# Patient Record
Sex: FEMALE | Race: WHITE | Employment: FULL TIME | URBAN - METROPOLITAN AREA
[De-identification: names, ages, dates, MRNs, and addresses within clinical notes are randomized per-mention and may not be internally consistent; named-entity substitution may affect disease eponyms.]

---

## 2024-03-04 ENCOUNTER — APPOINTMENT (OUTPATIENT)
Dept: RADIOLOGY | Facility: AMBULARY SURGERY CENTER | Age: 39
End: 2024-03-04
Payer: COMMERCIAL

## 2024-03-04 ENCOUNTER — OFFICE VISIT (OUTPATIENT)
Dept: OBGYN CLINIC | Facility: CLINIC | Age: 39
End: 2024-03-04

## 2024-03-04 VITALS
HEART RATE: 99 BPM | SYSTOLIC BLOOD PRESSURE: 142 MMHG | BODY MASS INDEX: 28.68 KG/M2 | WEIGHT: 168 LBS | HEIGHT: 64 IN | DIASTOLIC BLOOD PRESSURE: 84 MMHG

## 2024-03-04 DIAGNOSIS — G89.29 CHRONIC LEFT SHOULDER PAIN: Primary | ICD-10-CM

## 2024-03-04 DIAGNOSIS — M25.512 CHRONIC LEFT SHOULDER PAIN: ICD-10-CM

## 2024-03-04 DIAGNOSIS — M25.512 CHRONIC LEFT SHOULDER PAIN: Primary | ICD-10-CM

## 2024-03-04 DIAGNOSIS — G89.29 CHRONIC LEFT SHOULDER PAIN: ICD-10-CM

## 2024-03-04 PROCEDURE — 73030 X-RAY EXAM OF SHOULDER: CPT

## 2024-03-04 PROCEDURE — 99204 OFFICE O/P NEW MOD 45 MIN: CPT | Performed by: PHYSICAL MEDICINE & REHABILITATION

## 2024-03-04 PROCEDURE — 20610 DRAIN/INJ JOINT/BURSA W/O US: CPT | Performed by: PHYSICAL MEDICINE & REHABILITATION

## 2024-03-04 RX ORDER — ROPIVACAINE HYDROCHLORIDE 5 MG/ML
10 INJECTION, SOLUTION EPIDURAL; INFILTRATION; PERINEURAL
Status: COMPLETED | OUTPATIENT
Start: 2024-03-04 | End: 2024-03-04

## 2024-03-04 RX ORDER — TRIAMCINOLONE ACETONIDE 40 MG/ML
80 INJECTION, SUSPENSION INTRA-ARTICULAR; INTRAMUSCULAR
Status: COMPLETED | OUTPATIENT
Start: 2024-03-04 | End: 2024-03-04

## 2024-03-04 RX ADMIN — ROPIVACAINE HYDROCHLORIDE 10 ML: 5 INJECTION, SOLUTION EPIDURAL; INFILTRATION; PERINEURAL at 12:00

## 2024-03-04 RX ADMIN — TRIAMCINOLONE ACETONIDE 80 MG: 40 INJECTION, SUSPENSION INTRA-ARTICULAR; INTRAMUSCULAR at 12:00

## 2024-03-04 NOTE — PROGRESS NOTES
1. Chronic left shoulder pain      Orders Placed This Encounter   Procedures    Large joint arthrocentesis: L subacromial bursa    XR shoulder 2+ vw left     New Medications Ordered This Visit   Medications    esomeprazole (NexIUM) 20 mg capsule     Sig: Take 20 mg by mouth every morning before breakfast     Impression:  Left shoulder pain likely secondary to subacromial impingement/calcific tendinitis.  We discussed different treatment options and decided to proceed with a subacromial space steroid injection.  She will continue with her home exercise program.  I will see her back in 4 weeks if needed.    Imaging Studies (I personally reviewed images in PACS and report):  Left shoulder x-rays most recent to this encounter reviewed.  These images show a small rounded radiodensity at the greater tuberosity which likely represents calcific tendinitis.  No acute osseous abnormalities or severe degeneration.    No follow-ups on file.    Patient is in agreement with the above plan.    HPI:  Lynn Marin is a 39 y.o. female  who presents for evaluation of   Chief Complaint   Patient presents with    Left Shoulder - Pain     Onset/Mechanism: Chronic pain for past 6 years without an injury.  Location: Down the arm and into the neck.  Radiation: As above.  Provocative: Reaching activities.  Severity: Painful.  Associated Symptoms: Denies.  Treatment so far: 2019 she had an injection which helped.    Following history reviewed and updated:  History reviewed. No pertinent past medical history.  History reviewed. No pertinent surgical history.  Social History   Social History     Substance and Sexual Activity   Alcohol Use None     Social History     Substance and Sexual Activity   Drug Use Not on file     Social History     Tobacco Use   Smoking Status Not on file   Smokeless Tobacco Not on file     History reviewed. No pertinent family history.  Allergies   Allergen Reactions    Latex Swelling    Other Anaphylaxis      "Chickpeas    Raspberry - Food Allergy Hives    Sulfa Antibiotics Dizziness    Doxycycline Rash        Constitutional:  /84   Pulse 99   Ht 5' 4\" (1.626 m)   Wt 76.2 kg (168 lb)   BMI 28.84 kg/m²    General: NAD.  Eyes: Anicteric sclerae.  Neck: Supple.  Lungs: Unlabored breathing.  Cardiovascular: No lower extremity edema.  Skin: Intact without erythema.  Neurologic: Sensation intact to light touch.  Psychiatric: Mood and affect are appropriate.    Left Shoulder Exam     Tenderness   The patient is experiencing tenderness in the acromion.    Range of Motion   Active abduction:  120 abnormal   Forward flexion:  normal     Tests   Parr test: positive  Impingement: positive    Other   Erythema: absent  Scars: absent  Sensation: normal  Pulse: present              Large joint arthrocentesis: L subacromial bursa  Universal Protocol:  Consent: Verbal consent obtained. Written consent not obtained.  Risks and benefits: risks, benefits and alternatives were discussed  Consent given by: patient  Timeout called at: 3/4/2024 12:04 PM.  Patient understanding: patient states understanding of the procedure being performed  Site marked: the operative site was marked  Patient identity confirmed: verbally with patient  Supporting Documentation  Indications: pain   Procedure Details  Location: shoulder - L subacromial bursa  Needle gauge: 21G 2''  Ultrasound guidance: no  Approach: posterolateral  Medications administered: 80 mg triamcinolone acetonide 40 mg/mL; 10 mL ropivacaine 0.5 %    Patient tolerance: patient tolerated the procedure well with no immediate complications  Dressing:  Sterile dressing applied    There was little to no resistance encountered during the injection.    Risks of this procedure include:    - Risk of bleeding since a needle is involved.  - Risk of infection (1/10,000 chance as per recent studies).  Signs/symptoms were discussed and they would prompt an urgent evaluation at an emergency " department.  - Risk of pigmentation or skin dimpling in the skin (2-3% chance as per recent studies) from the steroid.  - Risk of increased pain from steroid flare (1% chance as per recent studies) that typically lasts 24-48 hours.  - Risk of increased blood sugars from the steroid medication that can last for a few weeks.  If the patient is a diabetic or pre-diabetic, they were encouraged to closely monitor their blood sugars and discuss with PCP if elevated more than usual or if having symptoms.    The benefits outweigh the risks and so the procedure was completed.

## 2024-04-29 ENCOUNTER — OFFICE VISIT (OUTPATIENT)
Dept: OBGYN CLINIC | Facility: CLINIC | Age: 39
End: 2024-04-29
Payer: COMMERCIAL

## 2024-04-29 VITALS
WEIGHT: 168 LBS | HEART RATE: 88 BPM | DIASTOLIC BLOOD PRESSURE: 82 MMHG | SYSTOLIC BLOOD PRESSURE: 121 MMHG | HEIGHT: 64 IN | BODY MASS INDEX: 28.68 KG/M2

## 2024-04-29 DIAGNOSIS — M79.18 MYOFASCIAL PAIN SYNDROME, CERVICAL: ICD-10-CM

## 2024-04-29 DIAGNOSIS — G89.29 CHRONIC LEFT SHOULDER PAIN: Primary | ICD-10-CM

## 2024-04-29 DIAGNOSIS — M25.512 CHRONIC LEFT SHOULDER PAIN: Primary | ICD-10-CM

## 2024-04-29 PROCEDURE — 99214 OFFICE O/P EST MOD 30 MIN: CPT | Performed by: PHYSICAL MEDICINE & REHABILITATION

## 2024-04-29 RX ORDER — PREDNISONE 20 MG/1
40 TABLET ORAL
Qty: 10 TABLET | Refills: 0 | Status: SHIPPED | OUTPATIENT
Start: 2024-04-29 | End: 2024-05-04

## 2024-04-29 RX ORDER — METHOCARBAMOL 750 MG/1
750 TABLET, FILM COATED ORAL
Qty: 30 TABLET | Refills: 0 | Status: SHIPPED | OUTPATIENT
Start: 2024-04-29

## 2024-04-29 NOTE — PROGRESS NOTES
1. Chronic left shoulder pain  predniSONE 20 mg tablet    methocarbamol (ROBAXIN) 750 mg tablet    Ambulatory referral to Physical Therapy      2. Myofascial pain syndrome, cervical  predniSONE 20 mg tablet    methocarbamol (ROBAXIN) 750 mg tablet    Ambulatory referral to Physical Therapy        Orders Placed This Encounter   Procedures    Ambulatory referral to Physical Therapy      Impression:  Patient is here in follow up of left shoulder pain likely secondary to subacromial impingement/calcific tendinitis and myofascial spasticity.  Treatment has included subacromial space steroid injection and home exercise program.  She had fleeting relief from this injection.  We will have her start formal physical therapy.  I also prescribed her oral steroids and muscle relaxant.  I will see her back in 4-5 weeks to reassess.  If still symptomatic, would consider cervical spine x-rays and further diagnostics.     Imaging Studies (I personally reviewed images in PACS and report):  Left shoulder x-rays most recent to this encounter reviewed.  These images show a small rounded radiodensity at the greater tuberosity which likely represents calcific tendinitis.  No acute osseous abnormalities or severe degeneration.    No follow-ups on file.    Patient is in agreement with the above plan.    HPI:  Lynn Marin is a 39 y.o. female  who presents in follow up.  Here for   Chief Complaint   Patient presents with    Left Shoulder - Pain, Follow-up     Pt reports short term but no last relief from CSI       Since last visit: See above.    Following history reviewed and updated:  History reviewed. No pertinent past medical history.  History reviewed. No pertinent surgical history.  Social History   Social History     Substance and Sexual Activity   Alcohol Use None     Social History     Substance and Sexual Activity   Drug Use Not on file     Social History     Tobacco Use   Smoking Status Not on file   Smokeless Tobacco Not on file  "    History reviewed. No pertinent family history.  Allergies   Allergen Reactions    Latex Swelling    Other Anaphylaxis     Chickpeas    Raspberry - Food Allergy Hives    Sulfa Antibiotics Dizziness    Doxycycline Rash        Constitutional:  /82   Pulse 88   Ht 5' 4\" (1.626 m)   Wt 76.2 kg (168 lb)   BMI 28.84 kg/m²    General: NAD.  Eyes: Clear sclerae.  ENT: No inflammation, lesion, or mass of lips.  No tracheal deviation.  Musculoskeletal: As mentioned below.  Integumentary: No visible rashes or skin lesions.  Pulmonary/Chest: Effort normal. No respiratory distress.   Neuro: CN's grossly intact, CORCORAN.  Psych: Normal affect and judgement.  Vascular: WWP.    Back Exam     Tenderness   The patient is experiencing tenderness in the cervical and thoracic.    Muscle Strength   The patient has normal back strength.    Other   Erythema: no back redness  Scars: absent    Comments:  Left periscapular border hypertonicity      Left Shoulder Exam     Tenderness   The patient is experiencing tenderness in the acromion and biceps tendon.    Range of Motion   The patient has normal left shoulder ROM.    Tests   Parr test: positive  Impingement: positive              Procedures  "

## 2024-09-10 ENCOUNTER — OFFICE VISIT (OUTPATIENT)
Dept: OBGYN CLINIC | Facility: CLINIC | Age: 39
End: 2024-09-10
Payer: COMMERCIAL

## 2024-09-10 VITALS
HEIGHT: 64 IN | HEART RATE: 101 BPM | SYSTOLIC BLOOD PRESSURE: 138 MMHG | DIASTOLIC BLOOD PRESSURE: 84 MMHG | BODY MASS INDEX: 28.68 KG/M2 | WEIGHT: 168 LBS

## 2024-09-10 DIAGNOSIS — M25.512 CHRONIC LEFT SHOULDER PAIN: Primary | ICD-10-CM

## 2024-09-10 DIAGNOSIS — G89.29 CHRONIC LEFT SHOULDER PAIN: Primary | ICD-10-CM

## 2024-09-10 PROCEDURE — 99213 OFFICE O/P EST LOW 20 MIN: CPT | Performed by: PHYSICAL MEDICINE & REHABILITATION

## 2024-09-10 NOTE — PROGRESS NOTES
"1. Chronic left shoulder pain          No orders of the defined types were placed in this encounter.       Impression:  Patient is here in follow up of left shoulder pain likely secondary to subacromial impingement/calcific tendinitis and bicep tendinitis.  Treatment has included subacromial space steroid injection and PT/home exercise program.  She has also had oral steroids and muscle relaxant.  Today her examination is most consistent with bicep tendinitis.  We will schedule her for an ultrasound-guided injection.  If no relief, would consider advanced imaging of her left shoulder.  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     Imaging Studies (I personally reviewed images in PACS and report):  Left shoulder x-rays most recent to this encounter reviewed.  These images show a small rounded radiodensity at the greater tuberosity which likely represents calcific tendinitis.  No acute osseous abnormalities or severe degeneration.    No follow-ups on file.    Patient is in agreement with the above plan.    HPI:  Lynn Marin is a 39 y.o. adult  who presents in follow up.  Here for   Chief Complaint   Patient presents with    Left Shoulder - Pain, Follow-up       Since last visit: See above.    Following history reviewed and updated:  History reviewed. No pertinent past medical history.  History reviewed. No pertinent surgical history.  Social History   Social History     Substance and Sexual Activity   Alcohol Use None     Social History     Substance and Sexual Activity   Drug Use Not on file     Social History     Tobacco Use   Smoking Status Not on file   Smokeless Tobacco Not on file     History reviewed. No pertinent family history.  Allergies   Allergen Reactions    Latex Swelling    Other Anaphylaxis     Chickpeas    Raspberry - Food Allergy Hives    Sulfa Antibiotics Dizziness    Doxycycline Rash        Constitutional:  /84   Pulse 101   Ht 5' 4\" (1.626 m)   Wt 76.2 kg (168 " lb)   BMI 28.84 kg/m²    General: NAD.  Eyes: Clear sclerae.  ENT: No inflammation, lesion, or mass of lips.  No tracheal deviation.  Musculoskeletal: As mentioned below.  Integumentary: No visible rashes or skin lesions.  Pulmonary/Chest: Effort normal. No respiratory distress.   Neuro: CN's grossly intact, CORCORAN.  Psych: Normal affect and judgement.  Vascular: WWP.    Left Shoulder Exam     Tenderness   The patient is experiencing tenderness in the biceps tendon and acromion.    Range of Motion   Active abduction:  140 abnormal   Forward flexion:  normal     Other   Erythema: absent  Scars: absent  Sensation: normal  Pulse: present     Comments:  Positive speeds test.             Procedures

## 2024-09-27 ENCOUNTER — PROCEDURE VISIT (OUTPATIENT)
Dept: OBGYN CLINIC | Facility: CLINIC | Age: 39
End: 2024-09-27
Payer: COMMERCIAL

## 2024-09-27 VITALS
HEART RATE: 97 BPM | SYSTOLIC BLOOD PRESSURE: 125 MMHG | WEIGHT: 168 LBS | DIASTOLIC BLOOD PRESSURE: 82 MMHG | HEIGHT: 64 IN | BODY MASS INDEX: 28.68 KG/M2

## 2024-09-27 DIAGNOSIS — G89.29 CHRONIC LEFT SHOULDER PAIN: Primary | ICD-10-CM

## 2024-09-27 DIAGNOSIS — M25.512 CHRONIC LEFT SHOULDER PAIN: Primary | ICD-10-CM

## 2024-09-27 PROCEDURE — 99212 OFFICE O/P EST SF 10 MIN: CPT | Performed by: PHYSICAL MEDICINE & REHABILITATION

## 2024-09-27 PROCEDURE — 20611 DRAIN/INJ JOINT/BURSA W/US: CPT | Performed by: PHYSICAL MEDICINE & REHABILITATION

## 2024-09-27 RX ORDER — ROPIVACAINE HYDROCHLORIDE 5 MG/ML
10 INJECTION, SOLUTION EPIDURAL; INFILTRATION; PERINEURAL
Status: COMPLETED | OUTPATIENT
Start: 2024-09-27 | End: 2024-09-27

## 2024-09-27 RX ORDER — TRIAMCINOLONE ACETONIDE 40 MG/ML
80 INJECTION, SUSPENSION INTRA-ARTICULAR; INTRAMUSCULAR
Status: COMPLETED | OUTPATIENT
Start: 2024-09-27 | End: 2024-09-27

## 2024-09-27 RX ADMIN — TRIAMCINOLONE ACETONIDE 80 MG: 40 INJECTION, SUSPENSION INTRA-ARTICULAR; INTRAMUSCULAR at 10:15

## 2024-09-27 RX ADMIN — ROPIVACAINE HYDROCHLORIDE 10 ML: 5 INJECTION, SOLUTION EPIDURAL; INFILTRATION; PERINEURAL at 10:15

## 2024-09-27 NOTE — PROGRESS NOTES
1. Chronic left shoulder pain          Orders Placed This Encounter   Procedures    Large joint arthrocentesis      Impression:  Patient is here in follow up of left shoulder pain likely secondary to subacromial impingement/calcific tendinitis and bicep tendinitis.  Treatment has included subacromial space steroid injection and PT/home exercise program.  She has also had oral steroids and muscle relaxant.  Today her examination is most consistent with bicep tendinitis.  We proceeded with an ultrasound-guided bicep tendon sheath injection.  She felt well afterwards.  If no relief, would consider advanced imaging of her left shoulder.  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     Imaging Studies (I personally reviewed images in PACS and report):  Left shoulder x-rays most recent to this encounter reviewed.  These images show a small rounded radiodensity at the greater tuberosity which likely represents calcific tendinitis.  No acute osseous abnormalities or severe degeneration.    No follow-ups on file.    Patient is in agreement with the above plan.    HPI:  Lynn Marin is a 39 y.o. adult  who presents in follow up.  Here for   Chief Complaint   Patient presents with    Left Shoulder - Pain, Injections, Follow-up       Since last visit: See above.    Following history reviewed and updated:  History reviewed. No pertinent past medical history.  History reviewed. No pertinent surgical history.  Social History   Social History     Substance and Sexual Activity   Alcohol Use None     Social History     Substance and Sexual Activity   Drug Use Not on file     Social History     Tobacco Use   Smoking Status Not on file   Smokeless Tobacco Not on file     History reviewed. No pertinent family history.  Allergies   Allergen Reactions    Latex Swelling    Other Anaphylaxis     Chickpeas    Raspberry - Food Allergy Hives    Sulfa Antibiotics Dizziness    Doxycycline Rash        Constitutional:  BP  "125/82   Pulse 97   Ht 5' 4\" (1.626 m)   Wt 76.2 kg (168 lb)   BMI 28.84 kg/m²    General: NAD.  Eyes: Clear sclerae.  ENT: No inflammation, lesion, or mass of lips.  No tracheal deviation.  Musculoskeletal: As mentioned below.  Integumentary: No visible rashes or skin lesions.  Pulmonary/Chest: Effort normal. No respiratory distress.   Neuro: CN's grossly intact, CORCORAN.  Psych: Normal affect and judgement.  Vascular: WWP.    Left Shoulder Exam     Tenderness   The patient is experiencing tenderness in the acromion and biceps tendon.    Other   Erythema: absent  Scars: absent  Sensation: normal  Pulse: present     Comments:  Positive Speed's test.             Large joint arthrocentesis: L subacromial bursa  [place]oy Protocol:  Procedure performed by:  Consent: Verbal consent obtained. Written consent not obtained.  Consent given by: patient  Timeout called at: 9/27/2024 10:31 AM.  Patient understanding: patient states understanding of the procedure being performed  Site marked: the operative site was marked  Radiology Images displayed and confirmed. If images not available, report reviewed: imaging studies available  Patient identity confirmed: verbally with patient  Supporting Documentation  Indications: pain and diagnostic evaluation   Procedure Details  Location: shoulder - L subacromial bursa  Ultrasound guidance: yes (US guidance was used to find the area of interest.)  Medications administered: 80 mg triamcinolone acetonide 40 mg/mL; 10 mL ropivacaine 0.5 %    Patient tolerance: patient tolerated the procedure well with no immediate complications  Dressing:  Sterile dressing applied    The left bicep tendon sheath was visualized with ultrasound and injected with steroid/anesthetic solution as indicated.    Prior to the injection, the ultrasound was used to evaluate for any neural or vascular structures.  Care was taken to avoid these structures.    The images (and video if taken) were saved to the [employer]" ultrasound system.    Risks of this procedure include:    - Risk of bleeding since a needle is involved.  - Risk of infection (1/10,000 chance as per recent studies).  Signs/symptoms were discussed and they would prompt an urgent evaluation at an emergency department.  - Risk of pigmentation or skin dimpling in the skin (2-3% chance as per recent studies) from the steroid.  - Risk of increased pain from steroid flare (1% chance as per recent studies) that typically lasts 24-48 hours.  - Risk of increased blood sugars from the steroid medication that can last for a few weeks.  If the patient is a diabetic or pre-diabetic, they were encouraged to closely monitor their blood sugars and discuss with PCP if elevated more than usual or if having symptoms.    We decided that the benefits outweigh the risks and so we proceeded with the procedure.

## 2025-01-07 ENCOUNTER — OFFICE VISIT (OUTPATIENT)
Dept: FAMILY MEDICINE CLINIC | Facility: CLINIC | Age: 40
End: 2025-01-07
Payer: COMMERCIAL

## 2025-01-07 VITALS
BODY MASS INDEX: 28.1 KG/M2 | RESPIRATION RATE: 16 BRPM | HEIGHT: 64 IN | SYSTOLIC BLOOD PRESSURE: 132 MMHG | OXYGEN SATURATION: 99 % | DIASTOLIC BLOOD PRESSURE: 84 MMHG | HEART RATE: 97 BPM | WEIGHT: 164.6 LBS | TEMPERATURE: 97.8 F

## 2025-01-07 DIAGNOSIS — Z00.00 ANNUAL PHYSICAL EXAM: ICD-10-CM

## 2025-01-07 DIAGNOSIS — Z91.018 FOOD ALLERGY: ICD-10-CM

## 2025-01-07 DIAGNOSIS — J45.20 MILD INTERMITTENT ASTHMA WITHOUT COMPLICATION: ICD-10-CM

## 2025-01-07 DIAGNOSIS — Z12.31 ENCOUNTER FOR SCREENING MAMMOGRAM FOR MALIGNANT NEOPLASM OF BREAST: ICD-10-CM

## 2025-01-07 DIAGNOSIS — K58.2 IRRITABLE BOWEL SYNDROME WITH BOTH CONSTIPATION AND DIARRHEA: ICD-10-CM

## 2025-01-07 DIAGNOSIS — F41.9 ANXIETY: Primary | ICD-10-CM

## 2025-01-07 PROCEDURE — 99396 PREV VISIT EST AGE 40-64: CPT | Performed by: FAMILY MEDICINE

## 2025-01-07 PROCEDURE — 99204 OFFICE O/P NEW MOD 45 MIN: CPT | Performed by: FAMILY MEDICINE

## 2025-01-07 RX ORDER — EPINEPHRINE HCL IN 0.9 % NACL 100 MCG/10
SYRINGE (ML) INTRAVENOUS
COMMUNITY
Start: 2013-05-06 | End: 2025-01-07

## 2025-01-07 RX ORDER — ALBUTEROL SULFATE 90 UG/1
2 INHALANT RESPIRATORY (INHALATION) EVERY 6 HOURS PRN
Qty: 6.7 G | Refills: 5 | Status: SHIPPED | OUTPATIENT
Start: 2025-01-07

## 2025-01-07 RX ORDER — EPINEPHRINE 0.3 MG/.3ML
0.3 INJECTION SUBCUTANEOUS ONCE
Qty: 0.6 ML | Refills: 0 | Status: SHIPPED | OUTPATIENT
Start: 2025-01-07 | End: 2025-01-07

## 2025-01-07 RX ORDER — ALPRAZOLAM 0.5 MG
0.5 TABLET ORAL
COMMUNITY

## 2025-01-07 RX ORDER — VENLAFAXINE HYDROCHLORIDE 37.5 MG/1
37.5 CAPSULE, EXTENDED RELEASE ORAL
Qty: 30 CAPSULE | Refills: 5 | Status: SHIPPED | OUTPATIENT
Start: 2025-01-07

## 2025-01-07 NOTE — ASSESSMENT & PLAN NOTE
Daily anxiety.  Has previously tried Prozac and Lexapro.  Prozac caused weight gain and Lexapro was not a good fit.  Recommend trial of Effexor 37.5 mg daily and will follow-up in 4 to 6 weeks.  Orders:  •  venlafaxine (EFFEXOR-XR) 37.5 mg 24 hr capsule; Take 1 capsule (37.5 mg total) by mouth daily with breakfast

## 2025-01-07 NOTE — ASSESSMENT & PLAN NOTE
Anaphylaxis to [facility].  Will send refill of EpiPen which is .  Orders:  •  EPINEPHrine (EPIPEN) 0.3 mg/0.3 mL SOAJ; Inject 0.3 mL (0.3 mg total) into a muscle once for 1 dose

## 2025-01-07 NOTE — ASSESSMENT & PLAN NOTE
Had previously seen GI.  Has chronic GERD as well which is controlled with Nexium.  No acute needs at this time.  Discussed that if Effexor does not work well for her we could consider trial of amitriptyline which may improve IBS symptoms as well.

## 2025-01-07 NOTE — ASSESSMENT & PLAN NOTE
Using albuterol very infrequently but would like to have on hand so refilled today.  Generally only has asthma exacerbations with upper respiratory illnesses or intense exercise.  Continue current treatment regimen and follow-up if any changes.  Orders:  •  albuterol (Proventil HFA) 90 mcg/act inhaler; Inhale 2 puffs every 6 (six) hours as needed for wheezing

## 2025-01-07 NOTE — PROGRESS NOTES
Adult Annual Physical  Name: Lynn Marin      : 1985      MRN: [id number]  Encounter Provider: Courtney Abebe DO  Encounter Date: 2025   Encounter department: [facility]    Assessment & Plan  Anxiety  Daily anxiety.  Has previously tried Prozac and Lexapro.  Prozac caused weight gain and Lexapro was not a good fit.  Recommend trial of Effexor 37.5 mg daily and will follow-up in 4 to 6 weeks.  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  Will send refill of EpiPen which is .  Orders:  •  EPINEPHrine (EPIPEN) 0.3 mg/0.3 mL SOAJ; Inject 0.3 mL (0.3 mg total) into a muscle once for 1 dose    Mild intermittent asthma without complication  Using albuterol very infrequently but would like to have on hand so refilled today.  Generally only has asthma exacerbations with upper respiratory illnesses or intense exercise.  Continue current treatment regimen and follow-up if any changes.  Orders:  •  albuterol (Proventil HFA) 90 mcg/act inhaler; Inhale 2 puffs every 6 (six) hours as needed for wheezing    Irritable bowel syndrome with both constipation and diarrhea  Had previously seen GI.  Has chronic GERD as well which is controlled with Nexium.  No acute needs at this time.  Discussed that if Effexor does not work well for her we could consider trial of amitriptyline which may improve IBS symptoms as well.       Annual physical exam    Orders:  •  CBC and Platelet; Future  •  Comprehensive metabolic panel; Future  •  Lipid panel; Future  •  Hemoglobin A1C; Future  •  Vitamin D 25 hydroxy; Future  •  Ambulatory Referral to Obstetrics / Gynecology; Future    Immunizations and preventive care screenings were discussed with patient  today. Appropriate education was printed on patient's after visit summary.    Counseling:  Alcohol/drug use: discussed moderation in alcohol intake, the recommendations for healthy alcohol use, and avoidance of illicit drug use.  Dental Health: discussed importance of regular tooth brushing, flossing, and dental visits.  Sexual health: discussed sexually transmitted diseases, partner selection, use of condoms, avoidance of unintended pregnancy, and contraceptive alternatives.  Exercise: the importance of regular exercise/physical activity was discussed. Recommend exercise 3-5 times per week for at least 30 minutes.          History of Present Illness       Here today to establish.  L shoulder pain is chronic, no injuries. Follows with  orthopedics Dr. Olmos, had biceps tendon injection in September, improved since then.   History of asthma, food allergy to chick peas resulting in anaphylaxis, anxiety.  Using albuterol infrequently for asthma exacerbations when sick. No recent use but needs refill.  Anxiety - was previously on Prozac and Lexapro. Lexapro made her cry uncontrollably. Prozac caused weight gain. Has prn Xanax for panic attacks, needs very rarely. Would like to try being on a daily preventative anxiolytic.      Most recent pap April 2023, normal.  Mammogram done in 2023.  Nexxium for GERD, does well with this, has had multiple endoscopies.  Last Tdap in the last 3 years.    Previously seen at [facility], will request records.        Adult Annual Physical:  Patient presents for annual physical.     Diet and Physical Activity:  - Diet/Nutrition: well balanced diet.  - Exercise: moderate cardiovascular exercise.    Depression Screening:  - PHQ-2 Score: 0    General Health:  - Sleep: sleeps poorly.  - Hearing: normal hearing bilateral ears.  - Vision: wears contacts and goes for regular eye exams.  - Dental: regular dental visits.    /GYN Health:  - Follows with GYN: no.     Review of  "Systems  Current Outpatient Medications on File Prior to Visit   Medication Sig Dispense Refill   • ALPRAZolam (XANAX) 0.5 mg tablet Take 0.5 mg by mouth daily at bedtime as needed for anxiety     • esomeprazole (NexIUM) 20 mg capsule Take 20 mg by mouth every morning before breakfast     • [DISCONTINUED] ALBUTEROL IN      • [DISCONTINUED] EPINEPHrine 0.1 MG/10ML SOSY      • [DISCONTINUED] methocarbamol (ROBAXIN) 750 mg tablet Take 1 tablet (750 mg total) by mouth daily at bedtime as needed for muscle spasms 30 tablet 0     No current facility-administered medications on file prior to visit.      Social History     Tobacco Use   • Smoking status: Never     Passive exposure: Never   • Smokeless tobacco: Never   Substance and Sexual Activity   • Alcohol use: Not on file   • Drug use: Not on file   • Sexual activity: Not on file       Objective   /84 (BP Location: Right arm, Patient Position: Sitting, Cuff Size: Standard)   Pulse 97   Temp 97.8 °F (36.6 °C) (Temporal)   Resp 16   Ht 5' 4\" (1.626 m)   Wt 74.7 kg (164 lb 9.6 oz)   SpO2 99%   BMI 28.25 kg/m²     Physical Exam  Vitals reviewed.   Constitutional:       Appearance: Normal appearance.   HENT:      Right Ear: External ear normal.      Left Ear: External ear normal.      Nose: Nose normal.      Mouth/Throat:      Mouth: Mucous membranes are moist.      Pharynx: Oropharynx is clear.   Eyes:      Extraocular Movements: Extraocular movements intact.      Conjunctiva/sclera: Conjunctivae normal.   Cardiovascular:      Rate and Rhythm: Normal rate and regular rhythm.      Heart sounds: Normal heart sounds.   Pulmonary:      Effort: Pulmonary effort is normal.      Breath sounds: Normal breath sounds.   Abdominal:      General: Abdomen is flat.   Skin:     General: Skin is warm and dry.   Neurological:      General: No focal deficit present.      Mental Status: She is alert and oriented to person, place, and time.   Psychiatric:         Mood and Affect: " Mood normal.         Behavior: Behavior normal.

## 2025-01-14 ENCOUNTER — RESULTS FOLLOW-UP (OUTPATIENT)
Dept: FAMILY MEDICINE CLINIC | Facility: CLINIC | Age: 40
End: 2025-01-14

## 2025-01-14 LAB
25(OH)D3+25(OH)D2 SERPL-MCNC: 105 NG/ML (ref 30–100)
ALBUMIN SERPL-MCNC: 4.5 G/DL (ref 3.9–4.9)
ALP SERPL-CCNC: 49 IU/L (ref 44–121)
ALT SERPL-CCNC: 13 IU/L (ref 0–32)
AST SERPL-CCNC: 17 IU/L (ref 0–40)
BASOPHILS # BLD AUTO: 0 X10E3/UL (ref 0–0.2)
BASOPHILS NFR BLD AUTO: 1 %
BILIRUB SERPL-MCNC: 0.4 MG/DL (ref 0–1.2)
BUN SERPL-MCNC: 14 MG/DL (ref 6–24)
BUN/CREAT SERPL: 18 (ref 9–23)
CALCIUM SERPL-MCNC: 9.1 MG/DL (ref 8.7–10.2)
CHLORIDE SERPL-SCNC: 103 MMOL/L (ref 96–106)
CHOLEST SERPL-MCNC: 162 MG/DL (ref 100–199)
CO2 SERPL-SCNC: 23 MMOL/L (ref 20–29)
CREAT SERPL-MCNC: 0.79 MG/DL (ref 0.57–1)
EGFR: 96 ML/MIN/1.73
EOSINOPHIL # BLD AUTO: 0.1 X10E3/UL (ref 0–0.4)
EOSINOPHIL NFR BLD AUTO: 2 %
ERYTHROCYTE [DISTWIDTH] IN BLOOD BY AUTOMATED COUNT: 13.3 % (ref 11.7–15.4)
GLOBULIN SER-MCNC: 2.4 G/DL (ref 1.5–4.5)
GLUCOSE SERPL-MCNC: 96 MG/DL (ref 70–99)
HBA1C MFR BLD: 5.4 % (ref 4.8–5.6)
HCT VFR BLD AUTO: 38 % (ref 34–46.6)
HDLC SERPL-MCNC: 83 MG/DL
HGB BLD-MCNC: 12.4 G/DL (ref 11.1–15.9)
IMM GRANULOCYTES # BLD: 0 X10E3/UL (ref 0–0.1)
IMM GRANULOCYTES NFR BLD: 0 %
LDLC SERPL CALC-MCNC: 71 MG/DL (ref 0–99)
LYMPHOCYTES # BLD AUTO: 1.1 X10E3/UL (ref 0.7–3.1)
LYMPHOCYTES NFR BLD AUTO: 24 %
MCH RBC QN AUTO: 30 PG (ref 26.6–33)
MCHC RBC AUTO-ENTMCNC: 32.6 G/DL (ref 31.5–35.7)
MCV RBC AUTO: 92 FL (ref 79–97)
MONOCYTES # BLD AUTO: 0.3 X10E3/UL (ref 0.1–0.9)
MONOCYTES NFR BLD AUTO: 7 %
NEUTROPHILS # BLD AUTO: 3.1 X10E3/UL (ref 1.4–7)
NEUTROPHILS NFR BLD AUTO: 66 %
PLATELET # BLD AUTO: 205 X10E3/UL (ref 150–450)
POTASSIUM SERPL-SCNC: 4.6 MMOL/L (ref 3.5–5.2)
PROT SERPL-MCNC: 6.9 G/DL (ref 6–8.5)
RBC # BLD AUTO: 4.13 X10E6/UL (ref 3.77–5.28)
SL AMB VLDL CHOLESTEROL CALC: 8 MG/DL (ref 5–40)
SODIUM SERPL-SCNC: 139 MMOL/L (ref 134–144)
TRIGL SERPL-MCNC: 36 MG/DL (ref 0–149)
WBC # BLD AUTO: 4.6 X10E3/UL (ref 3.4–10.8)

## 2025-04-14 ENCOUNTER — HOSPITAL ENCOUNTER (OUTPATIENT)
Dept: MAMMOGRAPHY | Facility: HOSPITAL | Age: 40
Discharge: HOME | End: 2025-04-14
Payer: COMMERCIAL

## 2025-04-14 VITALS — WEIGHT: 155 LBS | HEIGHT: 64 IN | BODY MASS INDEX: 26.46 KG/M2

## 2025-04-14 DIAGNOSIS — Z12.31 ENCOUNTER FOR SCREENING MAMMOGRAM FOR MALIGNANT NEOPLASM OF BREAST: ICD-10-CM

## 2025-04-14 PROCEDURE — 77067 SCR MAMMO BI INCL CAD: CPT

## 2025-04-14 PROCEDURE — 77063 BREAST TOMOSYNTHESIS BI: CPT

## 2025-04-23 ENCOUNTER — RESULTS FOLLOW-UP (OUTPATIENT)
Dept: FAMILY MEDICINE CLINIC | Facility: CLINIC | Age: 40
End: 2025-04-23

## 2025-04-23 ENCOUNTER — PATIENT MESSAGE (OUTPATIENT)
Dept: FAMILY MEDICINE CLINIC | Facility: CLINIC | Age: 40
End: 2025-04-23

## 2025-04-23 NOTE — RESULT ENCOUNTER NOTE
Spoke with Pt and left her know the results of her mammogram. Pt stated they already called her and she has scheduled a appointment for ultrasound

## 2025-05-13 ENCOUNTER — HOSPITAL ENCOUNTER (OUTPATIENT)
Dept: ULTRASOUND IMAGING | Facility: CLINIC | Age: 40
Discharge: HOME | End: 2025-05-13
Attending: FAMILY MEDICINE
Payer: COMMERCIAL

## 2025-05-13 VITALS — BODY MASS INDEX: 26.46 KG/M2 | HEIGHT: 64 IN | WEIGHT: 155 LBS

## 2025-05-13 DIAGNOSIS — R92.8 ABNORMAL MAMMOGRAM: ICD-10-CM

## 2025-05-13 PROCEDURE — 76642 ULTRASOUND BREAST LIMITED: CPT

## 2025-05-14 ENCOUNTER — RESULTS FOLLOW-UP (OUTPATIENT)
Dept: FAMILY MEDICINE CLINIC | Facility: CLINIC | Age: 40
End: 2025-05-14

## 2025-06-22 DIAGNOSIS — F41.9 ANXIETY: ICD-10-CM

## 2025-06-22 RX ORDER — VENLAFAXINE HYDROCHLORIDE 37.5 MG/1
CAPSULE, EXTENDED RELEASE ORAL
Qty: 30 CAPSULE | Refills: 5 | Status: SHIPPED | OUTPATIENT
Start: 2025-06-22